# Patient Record
Sex: FEMALE | Race: ASIAN | HISPANIC OR LATINO | Employment: FULL TIME | URBAN - METROPOLITAN AREA
[De-identification: names, ages, dates, MRNs, and addresses within clinical notes are randomized per-mention and may not be internally consistent; named-entity substitution may affect disease eponyms.]

---

## 2017-05-26 LAB
CHOLEST SERPL-MCNC: 251 MG/DL (ref 100–199)
GLUCOSE SERPL-MCNC: 100 MG/DL (ref 65–99)
HDLC SERPL-MCNC: 79 MG/DL
LDLC SERPL CALC-MCNC: 154 MG/DL (ref 0–99)
TRIGL SERPL-MCNC: 91 MG/DL (ref 0–149)

## 2017-06-01 ENCOUNTER — TRANSFERRED RECORDS (OUTPATIENT)
Dept: HEALTH INFORMATION MANAGEMENT | Facility: CLINIC | Age: 52
End: 2017-06-01

## 2017-06-01 LAB — HBA1C MFR BLD: 6.4 % (ref 4.8–5.6)

## 2019-07-05 ENCOUNTER — HOSPITAL ENCOUNTER (OUTPATIENT)
Dept: MAMMOGRAPHY | Facility: CLINIC | Age: 54
Discharge: HOME | End: 2019-07-05
Attending: OBSTETRICS & GYNECOLOGY | Admitting: OBSTETRICS & GYNECOLOGY
Payer: COMMERCIAL

## 2019-07-05 DIAGNOSIS — Z12.31 VISIT FOR SCREENING MAMMOGRAM: ICD-10-CM

## 2019-07-05 PROCEDURE — 77063 BREAST TOMOSYNTHESIS BI: CPT

## 2021-09-06 ENCOUNTER — OFFICE VISIT (OUTPATIENT)
Dept: URGENT CARE | Facility: URGENT CARE | Age: 56
End: 2021-09-06
Payer: COMMERCIAL

## 2021-09-06 ENCOUNTER — NURSE TRIAGE (OUTPATIENT)
Dept: NURSING | Facility: CLINIC | Age: 56
End: 2021-09-06

## 2021-09-06 VITALS
WEIGHT: 117.2 LBS | DIASTOLIC BLOOD PRESSURE: 64 MMHG | HEART RATE: 61 BPM | SYSTOLIC BLOOD PRESSURE: 112 MMHG | TEMPERATURE: 98.5 F

## 2021-09-06 DIAGNOSIS — L24.9 IRRITANT CONTACT DERMATITIS, UNSPECIFIED TRIGGER: Primary | ICD-10-CM

## 2021-09-06 DIAGNOSIS — Z91.89 RISK OF EXPOSURE TO LYME DISEASE: ICD-10-CM

## 2021-09-06 DIAGNOSIS — L24.9 IRRITANT CONTACT DERMATITIS, UNSPECIFIED TRIGGER: ICD-10-CM

## 2021-09-06 DIAGNOSIS — L29.9 ITCHING: ICD-10-CM

## 2021-09-06 PROCEDURE — 99203 OFFICE O/P NEW LOW 30 MIN: CPT | Performed by: PHYSICIAN ASSISTANT

## 2021-09-06 PROCEDURE — 36415 COLL VENOUS BLD VENIPUNCTURE: CPT | Performed by: PHYSICIAN ASSISTANT

## 2021-09-06 PROCEDURE — 86618 LYME DISEASE ANTIBODY: CPT | Performed by: PHYSICIAN ASSISTANT

## 2021-09-06 RX ORDER — CETIRIZINE HYDROCHLORIDE 10 MG/1
10 TABLET ORAL DAILY
Qty: 30 TABLET | Refills: 0 | Status: SHIPPED | OUTPATIENT
Start: 2021-09-06

## 2021-09-06 RX ORDER — METHYLPREDNISOLONE 4 MG
TABLET, DOSE PACK ORAL
Qty: 21 TABLET | Refills: 0 | Status: SHIPPED | OUTPATIENT
Start: 2021-09-06

## 2021-09-06 RX ORDER — TRIAMCINOLONE ACETONIDE 5 MG/G
CREAM TOPICAL 2 TIMES DAILY
Qty: 60 G | Refills: 0 | Status: SHIPPED | OUTPATIENT
Start: 2021-09-06

## 2021-09-06 NOTE — PROGRESS NOTES
Assessment & Plan     Irritant contact dermatitis, unspecified trigger  Possibly from material in sports bra  Start medrol, zyrtec  Follow up with derm  - methylPREDNISolone (MEDROL DOSEPAK) 4 MG tablet therapy pack; Follow package instructions  - cetirizine (ZYRTEC) 10 MG tablet; Take 1 tablet (10 mg) by mouth daily  - triamcinolone (ARISTOCORT HP) 0.5 % external cream; Apply topically 2 times daily    Itching  Medrol, zyrtec  Steroid cream for up to 14 days  - methylPREDNISolone (MEDROL DOSEPAK) 4 MG tablet therapy pack; Follow package instructions  - cetirizine (ZYRTEC) 10 MG tablet; Take 1 tablet (10 mg) by mouth daily  - triamcinolone (ARISTOCORT HP) 0.5 % external cream; Apply topically 2 times daily    Risk of exposure to Lyme disease  Lyme pending  - Lyme Disease Nina with reflex to WB Serum; Future    Review of external notes as documented elsewhere in note           No follow-ups on file.    Tigre Ortiz PA-C  [facility] P[facility] is a 56 year old who presents for the following health issues  accompanied by her spouse:    HPI     Rash on chest and back  Since July  Very itchy    Review of Systems   Constitutional, HEENT, cardiovascular, pulmonary, gi and gu systems are negative, except as otherwise noted.      Objective    /64 (BP Location: Right arm, Patient Position: Sitting, Cuff Size: Adult Regular)   Pulse 61   Temp 98.5  F (36.9  C) (Oral)   Wt 53.2 kg (117 lb 3.2 oz)   There is no height or weight on file to calculate BMI.  Physical Exam   GENERAL: healthy, alert and no distress  MS: no gross musculoskeletal defects noted, no edema  SKIN: Positive for itching, redness, well demarcated rash in area of sports bra  NEURO: Normal strength and tone, mentation intact and speech normal  PSYCH: mentation appears normal, affect normal/bright      Lyme pending

## 2021-09-06 NOTE — TELEPHONE ENCOUNTER
TELEPHONE CALL :    [facility] and  [facility] [place] [facility] are close today.   This are the medication prescribed today:    methylPREDNISolone (MEDROL DOSEPAK) 4 MG tablet therapy pack; Follow package instructions  - cetirizine (ZYRTEC) 10 MG tablet; Take 1 tablet (10 mg) by mouth daily  - triamcinolone (ARISTOCORT HP) 0.5 % external cream; Apply topically 2 times daily      Prescribing Provider's NPI: [id number]  Tigre Ortiz    Call pharmacy RX into the pharmacy provided by pt at this #  [phone] pharmacy will ave them done shortly.     Called pt and let her know.    Pearl Ashford RN Nurse Triage Advisor 11:41 AM 9/6/2021

## 2021-09-06 NOTE — PATIENT INSTRUCTIONS
"  Patient Education     Contact Dermatitis  Contact dermatitis is a skin rash caused by something that touches the skin and makes it irritated and inflamed. Your skin may be red, swollen, dry, and may be cracked. Blisters may form and ooze. The rash will itch.   Contact dermatitis often forms on the face and neck, backs of hands, forearms, genitals, and lower legs. But it can affect any area.   People can get contact dermatitis from lots of sources. These include:    Plants such as poison ivy, oak, or sumac    Chemicals in hair dyes and rinses, soaps, solvents, waxes, fingernail polish, and deodorants     Jewelry or watchbands made of nickel or cobalt  Contact dermatitis is not passed from person to person.  Talk with your healthcare provider about what may have caused the rash. A type of allergy testing called \"patch testing\" may be used to discover what you are allergic to. You will need to stay away from the source of the rash in the future to prevent it from coming back.   Treatment is done to ease itching and prevent the rash from coming back. The rash should go away in a few days to a few weeks.   Home care  Your healthcare provider may prescribe medicine to ease swelling and itching. Follow all instructions when using these medicines.   General care    Stay away from anything that heats up your skin, such as hot showers or baths, or direct sunlight. This can make itching worse.    Apply cold compresses to soothe your sores to help ease your symptoms. Do this for 30 minutes 3 to 4 times a day. You can make a cold compress by soaking a cloth in cold water. Squeeze out excess water. You can add colloidal oatmeal to the water to help reduce itching. For severe itching in a small area, apply an ice pack wrapped in a thin towel. Do this for 20 minutes 3 to 4 times a day.    You can also try wet dressings. One way to do this is to wear a wet piece of clothing under a dry one. Wear a damp shirt under a dry shirt if " your upper body is affected. This can relieve itching and prevent you from scratching the affected area.    You can also help ease large areas of itching by taking a lukewarm bath with colloidal oatmeal added to the water.    Use hydrocortisone cream for redness and irritation, unless another medicine was prescribed. Calamine lotion can also relieve mild symptoms.    Use oral diphenhydramine to help reduce itching. You can buy this antihistamine at drugstores and grocery stores. It can make you sleepy, so use lower doses during the daytime. Don't use diphenhydramine if you have glaucoma or have trouble urinating because of an enlarged prostate.    If a plant causes your rash, make sure to wash your skin and the clothes you were wearing when you came into contact with the plant. This is to wash away the plant oils that gave you the rash and prevent more or worse symptoms. If you have a pet that's been outdoors, its fur may also have oil from the plant. Bathe your pet with soap or shampoo.    Stay away from the substance or object that causes your symptoms. If you can t stay away from it, wear gloves or some other type of protection    Follow-up care  Follow up with your healthcare provider, or as advised.  When to seek medical advice  Call your healthcare provider or seek medical attention right away if any of these occur:     Spreading of the rash to other parts of your body    Severe swelling of your face, eyelids, mouth, throat or tongue    Trouble urinating due to swelling in the genital area    Fever of 100.4 F (38 C) or higher, or as advised by your provider    Redness or swelling that gets worse    Pain that gets worse    Foul-smelling fluid leaking from the skin    Yellow-brown crusts on the open blisters  [employer] last reviewed this educational content on 8/1/2019 2000-2021 [employer]. All rights reserved. This information is not intended as a substitute for professional medical care.  Always follow your healthcare professional's instructions.           Patient Education     Understanding Contact Dermatitis     A cool, moist compress can help reduce itching.   Contact dermatitis is a common type of skin rash. It s caused by something that touches the skin and makes it irritated and inflamed. It can occur on skin on any part of the body, such as the face, neck, hands, arms, and legs. Contact dermatitis is not spread from person to person.  Often, the reaction of contact dermatitis occurs 1 to 2 days after contact with the offending agent.    How to say it  GISSEL-tact [id number]  What causes contact dermatitis?  It s caused by something that irritates the skin, or that creates an allergic reaction on the skin. People can get contact dermatitis from many kinds of things. These include:     Plant oils in poison ivy, oak, and sumac    Chemicals in household , solvents, and glue    Chemicals in makeup, soap, laundry detergent, perfume, acne cream, and hair products    Certain medicines, such as neomycin, bacitracin, benzocaine, and thimerosal    Metals such as nickel, found in some jewelry and watch bands     The sticky material on the back of bandages and tape (adhesive)    Things that can cause tiny breaks in the skin, such as wood, fiberglass, metal tools, and plant thorns    Rubber latex in surgical gloves and other medical supplies  Dermatitis can also be caused by the skin being damp for long periods of time. This can happen from washing your hands too often, or working with wet materials.   Symptoms of contact dermatitis  Symptoms can include skin that is:    Blistered    Burning    Cracked    Dry    Itchy    Painful    Red    Rough, thickened, and leathery    Swollen    Warm  The blisters may ooze fluid and form crusts.  Treatment for contact dermatitis  Treatment is done to help relieve itching and reduce inflammation. The rash should go away in a few days to a few weeks. Treatments  include:     Cool, moist compress. Use a clean damp cloth. Put it on the area for 20 to 30 minutes, 5 to 6 times a day for the first 3 days.    Steroid cream or ointment. You can apply this medicine several times a day on clean skin.    Oral corticosteroid. Your healthcare provider may prescribe this medicine if you have severe skin symptoms on a large part of your body.  Your healthcare provider may give you a steroid injection instead of pills.    Oral antihistamine. This medicine can help reduce itching.    Colloidal oatmeal bath. Soaking in water with colloidal oatmeal can help soothe skin.    Plain cream, lotion, or ointment. Cream, lotion, or ointment without medicine can help to soothe and protect your skin.  Living with contact dermatitis  Talk with your healthcare provider about what may have caused your contact dermatitis. Patch testing may help you figure out what caused the rash so you can avoid further contact with it. Once you learn what caused your rash, make sure to avoid that substance. If your skin comes into contact with it again, make sure to wash your skin right away. If you can t avoid the substance, wear gloves or other protective clothing before you touch it. Wash the clothing you were wearing. This is because the substance you are allergic to may stay on them until it's washed off. Pets can also pass on allergens such as poison ivy from the plant to your skin. Bathe pets if you suspect they have been in contact. Or use a cream, lotion, or ointment to protect your skin.   When to call your healthcare provider  Call your healthcare provider right away if you have any of these:    Fever of 100.4 F (38 C) or higher, or as directed by your healthcare provider    Symptoms that don t get better, or get worse    New symptoms  [employer] last reviewed this educational content on 6/1/2019 2000-2021 [employer]. All rights reserved. This information is not intended as a substitute for  professional medical care. Always follow your healthcare professional's instructions.

## 2021-09-07 LAB — B BURGDOR IGG+IGM SER QL: 0.07
